# Patient Record
Sex: FEMALE | Race: WHITE | Employment: FULL TIME | URBAN - METROPOLITAN AREA
[De-identification: names, ages, dates, MRNs, and addresses within clinical notes are randomized per-mention and may not be internally consistent; named-entity substitution may affect disease eponyms.]

---

## 2021-11-16 ENCOUNTER — OFFICE VISIT (OUTPATIENT)
Dept: PRIMARY CARE CLINIC | Age: 26
End: 2021-11-16
Payer: COMMERCIAL

## 2021-11-16 VITALS
WEIGHT: 165 LBS | TEMPERATURE: 97.8 F | HEIGHT: 67 IN | SYSTOLIC BLOOD PRESSURE: 116 MMHG | DIASTOLIC BLOOD PRESSURE: 80 MMHG | BODY MASS INDEX: 25.9 KG/M2 | HEART RATE: 63 BPM | OXYGEN SATURATION: 99 % | RESPIRATION RATE: 16 BRPM

## 2021-11-16 DIAGNOSIS — Z00.00 ANNUAL PHYSICAL EXAM: Primary | ICD-10-CM

## 2021-11-16 DIAGNOSIS — E55.9 VITAMIN D DEFICIENCY: ICD-10-CM

## 2021-11-16 PROCEDURE — 99385 PREV VISIT NEW AGE 18-39: CPT | Performed by: INTERNAL MEDICINE

## 2021-11-16 RX ORDER — LEVONORGESTREL 19.5 MG/1
INTRAUTERINE DEVICE INTRAUTERINE
COMMUNITY

## 2021-11-16 NOTE — PROGRESS NOTES
Chief Complaint   Patient presents with   [address] Maintenance Due   Topic    Hepatitis C Screening     DTaP/Tdap/Td series (1 - Tdap)    Pap Smear         1. Have you been to the ER, urgent care clinic since your last visit? Hospitalized since your last visit? No    2. Have you seen or consulted any other health care providers outside of the [address] [place] [address] since your last visit? Include any pap smears or colon screening.  No    Visit Vitals  /80 (BP 1 Location: Right arm, BP Patient Position: Sitting, BP Cuff Size: Adult)   Pulse 63   Temp 97.8 °F (36.6 °C) (Temporal)   Resp 16   Ht 5' 7\" (1.702 m)   Wt 165 lb (74.8 kg)   SpO2 99%   BMI 25.84 kg/m²

## 2021-11-16 NOTE — PROGRESS NOTES
Che Stiles is a 31 y.o.  female and presents with     Chief Complaint   Patient presents with   [address]     Pt is here to establish care. Pt works for [email or web address]. Pt had physical four years back. Does not smoke  Pt has 4 drinks per week. Grandmother had maternal cancer. However Mom tested neg for BRCA gene  Maternal grandfather  of CHF. Paternal grandfather had Alzheimers   Cousin  of heart failure at age 25 . Had heart transplant. Father had basal cell carcinoma and takes meds for HTN. NO thyroid issues. Never been pregnant  Pts brother is going through mental health problem and has either bipolar or schizophrenia  Pt is engaged and is having some issues. Pt has been with her partner and has been working together in same work place for 5 years. The company where she works got bought. She is not sure if they will have t move. Pt is looking for therapist.        History reviewed. No pertinent past medical history. History reviewed. No pertinent surgical history. Current Outpatient Medications   Medication Sig    levonorgestreL (Kyleena) 17.5 mcg/24 hrs (5 yrs) 19.5 mg IUD IUD      No current facility-administered medications for this visit. Health Maintenance   Topic Date Due    DTaP/Tdap/Td series (1 - Tdap) 2022 (Originally 2015)    Pap Smear  2023 (Originally 2015)    Flu Vaccine  Completed    COVID-19 Vaccine  Completed    Pneumococcal 0-64 years  Aged Out    Hepatitis C Screening  Discontinued     Immunization History   Administered Date(s) Administered    COVID-19, PFIZER, MRNA, LNP-S, PF, 30MCG/0.3ML DOSE 2021, 2021    Influenza Vaccine 2021     No LMP recorded. Patient has had an implant. Allergies and Intolerances:   No Known Allergies    Family History:   History reviewed. No pertinent family history. Social History:   She  reports that she has never smoked.  She has never used smokeless tobacco. She  reports current alcohol use. Review of Systems:   General: negative for - chills, fatigue, fever, weight change  Psych: negative for - anxiety, depression, irritability or mood swings  ENT: negative for - headaches, hearing change, nasal congestion, oral lesions, sneezing or sore throat  Heme/ Lymph: negative for - bleeding problems, bruising, pallor or swollen lymph nodes  Endo: negative for - hot flashes, polydipsia/polyuria or temperature intolerance  Resp: negative for - cough, shortness of breath or wheezing  CV: negative for - chest pain, edema or palpitations  GI: negative for - abdominal pain, change in bowel habits, constipation, diarrhea or nausea/vomiting  : negative for - dysuria, hematuria, incontinence, pelvic pain or vulvar/vaginal symptoms  MSK: negative for - joint pain, joint swelling or muscle pain  Neuro: negative for - confusion, headaches, seizures or weakness  Derm: negative for - dry skin, hair changes, rash or skin lesion changes          Physical:   Vitals:   Vitals:    11/16/21 1351   BP: 116/80   Pulse: 63   Resp: 16   Temp: 97.8 °F (36.6 °C)   TempSrc: Temporal   SpO2: 99%   Weight: 165 lb (74.8 kg)   Height: 5' 7\" (1.702 m)           Exam:   HEENT- atraumatic,normocephalic, awake, oriented, well nourished  Neck - supple,no enlarged lymph nodes, no JVD, no thyromegaly  Chest- CTA, no rhonchi, no crackles  `````````````````````````````````````````````````````````````````````Heart- rrr, no murmurs / gallop/rub  Abdomen- soft,BS+,NT, no hepatosplenomegaly  Ext - no c/c/edema   Neuro- no focal deficits. Power 5/5 all extremities  Skin - warm,dry, no obvious rashes.           Review of Data:   LABS:   No results found for: WBC, HGB, HCT, PLT, HGBEXT, HCTEXT, PLTEXT, HGBEXT, HCTEXT, PLTEXT  No results found for: NA, K, CL, CO2, GLU, BUN, CREA  No results found for: CHOL, CHOLX, CHLST, CHOLV, HDL, HDLP, LDL, LDLC, DLDLP, TGLX, TRIGL, TRIGP  No components found for: GPT        Impression / Plan:        ICD-10-CM ICD-9-CM    1. Annual physical exam  Z00.00 V70.0 CBC WITH AUTOMATED DIFF      METABOLIC PANEL, COMPREHENSIVE      LIPID PANEL      VITAMIN D, 25 HYDROXY      TSH 3RD GENERATION   2. Vitamin D deficiency  E55.9 268.9 VITAMIN D, 25 HYDROXY     Gave resources withregard to Mental health therapists/psycholigsts/psychiatrists in the region. Explained to patient risk benefits of the medications. Advised patient to stop meds if having any side effects. Pt verbalized understanding of the instructions. I have discussed the diagnosis with the patient and the intended plan as seen in the above orders. The patient has received an after-visit summary and questions were answered concerning future plans. I have discussed medication side effects and warnings with the patient as well. I have reviewed the plan of care with the patient, accepted their input and they are in agreement with the treatment goals. Reviewed plan of care. Patient has provided input and agrees with goals. Follow-up and Dispositions    · Return in about 1 year (around 11/16/2022).          Yesi Carter MD

## 2022-04-08 ENCOUNTER — TELEPHONE (OUTPATIENT)
Dept: PRIMARY CARE CLINIC | Age: 27
End: 2022-04-08

## 2022-04-08 NOTE — TELEPHONE ENCOUNTER
Called patient let patient know there was no  appointments for today. Patient was advise to go to urgent care. Patient agreed and was told to call if she needed any further treatment.

## 2022-04-08 NOTE — TELEPHONE ENCOUNTER
----- Message from Jose Greene sent at 4/8/2022  7:39 AM EDT -----  Subject: Appointment Request    Reason for Call: Semi-Routine Cough, Cold Symptoms    QUESTIONS  Type of Appointment? Established Patient  Reason for appointment request? No appointments available during search  Additional Information for Provider? pt is wanting to schedule today if   possible for a sore throat, please call pt   ---------------------------------------------------------------------------  --------------  CALL BACK INFO  What is the best way for the office to contact you? OK to leave message on   voicemail  Preferred Call Back Phone Number? [phone]  ---------------------------------------------------------------------------  --------------  SCRIPT ANSWERS  Relationship to Patient? Self  Are you currently unable to finish sentences due to any difficulty   breathing? No  Are you unable to swallow liquids? No  Are you having fevers (100.4 or greater), chills, or sweats? No  Do you have COPD, asthma or a chronic lung condition? No  Have your symptoms been present for more than 5 days? No  Have you recently (14 days) been seen by a provider for this issue? No  Have you been diagnosed with, awaiting test results for, or told that you   are suspected of having COVID-19 (Coronavirus)? (If patient has tested   negative or was tested as a requirement for work, school, or travel and   not based on symptoms, answer no)? No  Within the past 10 days have you developed any of the following symptoms   (answer no if symptoms have been present longer than 10 days or began   more than 10 days ago)? Fever or Chills, Cough, Shortness of breath or   difficulty breathing, Loss of taste or smell, Sore throat, Nasal   congestion, Sneezing or runny nose, Fatigue or generalized body aches   (answer no if pain is specific to a body part e.g. back pain), Diarrhea,   Headache?  Yes

## 2022-04-12 ENCOUNTER — VIRTUAL VISIT (OUTPATIENT)
Dept: PRIMARY CARE CLINIC | Age: 27
End: 2022-04-12

## 2022-08-31 ENCOUNTER — VIRTUAL VISIT (OUTPATIENT)
Dept: PRIMARY CARE CLINIC | Age: 27
End: 2022-08-31
Payer: COMMERCIAL

## 2022-08-31 DIAGNOSIS — U07.1 COVID-19: Primary | ICD-10-CM

## 2022-08-31 DIAGNOSIS — R05.9 COUGH: ICD-10-CM

## 2022-08-31 PROCEDURE — 99214 OFFICE O/P EST MOD 30 MIN: CPT | Performed by: FAMILY MEDICINE

## 2022-08-31 RX ORDER — BENZONATATE 100 MG/1
100 CAPSULE ORAL
Qty: 15 CAPSULE | Refills: 0 | Status: SHIPPED | OUTPATIENT
Start: 2022-08-31

## 2022-08-31 NOTE — PROGRESS NOTES
Identified pt with two pt identifiers(name and ). Chief Complaint   Patient presents with    Cough    Fever     Up to 100.6       Positive For Covid-19     Monday positive - symptoms started Monday         3 most recent PHQ Screens 2022   Little interest or pleasure in doing things Not at all   Feeling down, depressed, irritable, or hopeless Not at all   Total Score PHQ 2 0   Trouble falling or staying asleep, or sleeping too much Not at all   Feeling tired or having little energy Not at all   Poor appetite, weight loss, or overeating Not at all   Feeling bad about yourself - or that you are a failure or have let yourself or your family down Not at all   Trouble concentrating on things such as school, work, reading, or watching TV Not at all   Moving or speaking so slowly that other people could have noticed; or the opposite being so fidgety that others notice Not at all   Thoughts of being better off dead, or hurting yourself in some way Not at all   PHQ 9 Score 0        There were no vitals filed for this visit. There are no preventive care reminders to display for this patient. 1. Have you been to the ER, urgent care clinic since your last visit? Hospitalized since your last visit? No    2. Have you seen or consulted any other health care providers outside of the [address] [place] [address] since your last visit? Include any pap smears or colon screening. No    3. For patients aged 39-70: Has the patient had a colonoscopy / FIT/ Cologuard? NA - based on age      If the patient is female:    4. For patients aged 41-77: Has the patient had a mammogram within the past 2 years? NA - based on age or sex      11. For patients aged 21-65: Has the patient had a pap smear?  Yes - no Care Gap present

## 2022-08-31 NOTE — PROGRESS NOTES
Festus Oconnor  28 y.o. female  1995  [address]  [id number]   [facility]   Telemedicine Progress Note  Reina Mallory DO       Encounter Date and Time: August 31, 2022 at 9:40 AM    Consent: Festus Oconnor, who was seen by synchronous (real-time) audio-video technology, and/or her healthcare decision maker, is aware that this patient-initiated, Telehealth encounter on 8/31/2022 is a billable service, with coverage as determined by her insurance carrier. She is aware that she may receive a bill and has provided verbal consent to proceed: Yes. Chief Complaint   Patient presents with    Cough    Fever     Up to 100.6       Positive For Covid-19     Monday positive - symptoms started Monday      History of Present Illness   Festus Oconnor is a 28 y.o. female was evaluated by synchronous (real-time) audio-video technology from home, through a secure patient portal.    Symptoms started and tested positive on Monday. She is having chills, body aches, sweating, fever on and off, headache, sore throat, congestion and runny nose. Sneezing/ coughing. Has Tylenol at home. Review of Systems   Review of Systems   Constitutional:  Positive for chills, diaphoresis, fever and malaise/fatigue. Respiratory:  Positive for cough. Negative for shortness of breath. Cardiovascular:  Negative for chest pain. Vitals/Objective:     General: alert, cooperative, no distress   Mental  status: mental status: alert, oriented to person, place, and time, normal mood, behavior, speech, dress, motor activity, and thought processes   Resp: resp: normal effort and no respiratory distress   Neuro: neuro: no gross deficits   Skin: skin: no discoloration or lesions of concern on visible areas   Due to this being a TeleHealth evaluation, many elements of the physical examination are unable to be assessed.       Assessment and Plan:   Time-based coding, delete if not needed: I spent at least 15 minutes with this established patient, and >50% of the time was spent counseling and/or coordinating care regarding COVID 19.    1. COVID-19  Symptomatic care discussed. Quarantine precautions given based on most current CDC recommendations. ER precautions include chest pain, shortness of breath, lethargy, confusion, decreased PO intake/ UOP. Follow up if symptoms are not improving.    - benzonatate (TESSALON) 100 mg capsule; Take 1 Capsule by mouth three (3) times daily as needed for Cough. Dispense: 15 Capsule; Refill: 0    2. Cough  - benzonatate (TESSALON) 100 mg capsule; Take 1 Capsule by mouth three (3) times daily as needed for Cough. Dispense: 15 Capsule; Refill: 0    Time spent in direct conversation with the patient to include medical condition(s) discussed, assessment and treatment plan: 15 min    We discussed the expected course, resolution and complications of the diagnosis(es) in detail. Medication risks, benefits, costs, interactions, and alternatives were discussed as indicated. I advised her to contact the office if her condition worsens, changes or fails to improve as anticipated. She expressed understanding with the diagnosis(es) and plan. Patient understands that this encounter was a temporary measure, and the importance of further follow up and examination was emphasized. Patient verbalized understanding. Electronically Signed: DO Yasmin Berumen is a 28 y.o. female who was evaluated by an audio-video encounter for concerns as above. Patient identification was verified prior to start of the visit. A caregiver was present when appropriate. Due to this being a TeleHealth encounter (During [facility]-05 public health emergency), evaluation of the following organ systems was limited: Vitals/Constitutional/EENT/Resp/CV/GI//MS/Neuro/Skin/Heme-Lymph-Imm.   Pursuant to the emergency declaration under the [facility] [facility], 4376 waiver authority and the [employer] Act, this Virtual Visit was conducted, with patient's (and/or legal guardian's) consent, to reduce the patient's risk of exposure to COVID-19 and provide necessary medical care. Services were provided through a synchronous discussion virtually to substitute for in-person clinic visit. I was in the office. The patient was at home. History   Patients past medical, surgical and family histories were reviewed and updated. History reviewed. No pertinent past medical history. History reviewed. No pertinent surgical history. History reviewed. No pertinent family history. Social History     Socioeconomic History    Marital status: SINGLE     Spouse name: Not on file    Number of children: Not on file    Years of education: Not on file    Highest education level: Not on file   Occupational History    Not on file   Tobacco Use    Smoking status: Never    Smokeless tobacco: Never   Vaping Use    Vaping Use: Never used   Substance and Sexual Activity    Alcohol use: Yes     Comment: 2 week    Drug use: Never    Sexual activity: Yes     Partners: Male     Birth control/protection: I.U.D. Other Topics Concern    Not on file   Social History Narrative    Not on file     Social Determinants of Health     Financial Resource Strain: Not on file   Food Insecurity: Not on file   Transportation Needs: Not on file   Physical Activity: Not on file   Stress: Not on file   Social Connections: Not on file   Intimate Partner Violence: Not on file   Housing Stability: Not on file     Patient Active Problem List   Diagnosis Code    ERRONEOUS ENCOUNTER--DISREGARD           Current Medications/Allergies   Medications and Allergies reviewed:    Current Outpatient Medications   Medication Sig Dispense Refill    benzonatate (TESSALON) 100 mg capsule Take 1 Capsule by mouth three (3) times daily as needed for Cough.  15 Capsule 0 levonorgestreL (Kyleena) 17.5 mcg/24 hrs (5 yrs) 19.5 mg IUD IUD  (Patient not taking: Reported on 8/31/2022)       No Known Allergies

## 2023-08-28 ENCOUNTER — TELEPHONE (OUTPATIENT)
Dept: PRIMARY CARE CLINIC | Facility: CLINIC | Age: 28
End: 2023-08-28

## 2023-08-28 NOTE — TELEPHONE ENCOUNTER
Had appointment today with Dr. Safia Sexton but she wants to start seeing Dr. Pincus Hammans. I cancelled her appointment today. (She is aware that I cancelled it).

## 2023-08-31 NOTE — TELEPHONE ENCOUNTER
Called and left Message for the patient to let her know she can schedule with  both providers have agreed.

## 2024-01-03 LAB
ABO, EXTERNAL RESULT: NORMAL
C. TRACHOMATIS, EXTERNAL RESULT: NEGATIVE
N. GONORRHOEAE, EXTERNAL RESULT: NEGATIVE
RH FACTOR, EXTERNAL RESULT: POSITIVE

## 2024-02-19 ENCOUNTER — OFFICE VISIT (OUTPATIENT)
Age: 29
End: 2024-02-19

## 2024-02-19 VITALS — BODY MASS INDEX: 26 KG/M2 | SYSTOLIC BLOOD PRESSURE: 118 MMHG | WEIGHT: 166 LBS | DIASTOLIC BLOOD PRESSURE: 66 MMHG

## 2024-02-19 DIAGNOSIS — Z34.90 PREGNANCY, UNSPECIFIED GESTATIONAL AGE: Primary | ICD-10-CM

## 2024-02-19 PROCEDURE — 0500F INITIAL PRENATAL CARE VISIT: CPT | Performed by: OBSTETRICS & GYNECOLOGY

## 2024-02-19 NOTE — PROGRESS NOTES
Obstetrical Transfer Note    Ms. Kenna Rivera is a ,  28 y.o. female White (non-) No LMP recorded. Patient is pregnant..  She is transferring to our practice after receiving OB care at [facility], Dr. Love.    She is doing well except for nausea, taking zofran.    EDC dating:  Her due date has been determined by LMP and first trimester US.     Today's US confirms reassuring fetal surveillance and her established EDC.    Her prenatal care up to this point has been notable for benign.    Relevant past pregnancy history:  G1    Relevant past medical history:(relevant to this pregnancy): noncontributory.       Pap/Occupational history:  Last pap smear: last year Results: Normal      Substance history: negative for alcohol, tobacco and street drugs.           Positive for nothing.  Exposure history: There is/are no indoor cat/s in the home.  The patient was instructed to not change the cat litter.   She admits close contact with children on a regular basis.   She has had chicken pox or the vaccine in the past.   Patient denies issues with domestic violence.     Genetic Screening/Teratology Counseling: (Includes patient, baby's father, or anyone in either family with:)  1.  Patient's age >/= 35 at EDC?--no  2.  Thalassemia (Angolan, Greek, Mediterranean, or  background): MCV<80?--no.     3.  Neural tube defect (meningomyelocele, spina bifida, anencephaly)?--no.   4.  Congenital heart defect?--no.  5.  Down syndrome?--no.   6.  Todd-Sachs (Caodaism, Bulgarian Wister)?--no.   7.  Canavan's Disease?--no.   8.  Familial Dysautonomia?--no.   9.  Sickle cell disease or trait ()?--no   The patient has not been tested for sickle trait  10.  Hemophilia or other blood disorders?--no.   11.  Muscular dystrophy?--no.  12.  Cystic fibrosis?--no.  13.  Inola's Chorea?--no.  14.  Mental retardation/autism (if yes was person tested for Fragile X)?--no.   15.  Other inherited genetic or chromosomal disorder?--no.

## 2024-03-18 ENCOUNTER — ROUTINE PRENATAL (OUTPATIENT)
Age: 29
End: 2024-03-18

## 2024-03-18 VITALS — DIASTOLIC BLOOD PRESSURE: 64 MMHG | BODY MASS INDEX: 26.47 KG/M2 | WEIGHT: 169 LBS | SYSTOLIC BLOOD PRESSURE: 112 MMHG

## 2024-03-18 DIAGNOSIS — Z3A.15 15 WEEKS GESTATION OF PREGNANCY: Primary | ICD-10-CM

## 2024-03-18 LAB
ABO + RH BLD: NORMAL
BLOOD BANK CMNT PATIENT-IMP: NORMAL
BLOOD GROUP ANTIBODIES SERPL: NORMAL
SPECIMEN EXP DATE BLD: NORMAL

## 2024-03-18 PROCEDURE — 0502F SUBSEQUENT PRENATAL CARE: CPT | Performed by: OBSTETRICS & GYNECOLOGY

## 2024-03-18 RX ORDER — ONDANSETRON 4 MG/1
TABLET, FILM COATED ORAL
COMMUNITY
Start: 2024-01-22 | End: 2024-03-18 | Stop reason: SDUPTHER

## 2024-03-18 RX ORDER — ONDANSETRON 4 MG/1
4 TABLET, FILM COATED ORAL EVERY 8 HOURS PRN
Qty: 20 TABLET | Refills: 1 | Status: SHIPPED | OUTPATIENT
Start: 2024-03-18

## 2024-03-18 SDOH — ECONOMIC STABILITY: FOOD INSECURITY: WITHIN THE PAST 12 MONTHS, THE FOOD YOU BOUGHT JUST DIDN'T LAST AND YOU DIDN'T HAVE MONEY TO GET MORE.: NEVER TRUE

## 2024-03-18 SDOH — ECONOMIC STABILITY: FOOD INSECURITY: WITHIN THE PAST 12 MONTHS, YOU WORRIED THAT YOUR FOOD WOULD RUN OUT BEFORE YOU GOT MONEY TO BUY MORE.: NEVER TRUE

## 2024-03-18 SDOH — ECONOMIC STABILITY: HOUSING INSECURITY
IN THE LAST 12 MONTHS, WAS THERE A TIME WHEN YOU DID NOT HAVE A STEADY PLACE TO SLEEP OR SLEPT IN A SHELTER (INCLUDING NOW)?: NO

## 2024-03-18 SDOH — ECONOMIC STABILITY: INCOME INSECURITY: HOW HARD IS IT FOR YOU TO PAY FOR THE VERY BASICS LIKE FOOD, HOUSING, MEDICAL CARE, AND HEATING?: NOT HARD AT ALL

## 2024-03-18 SDOH — ECONOMIC STABILITY: TRANSPORTATION INSECURITY
IN THE PAST 12 MONTHS, HAS LACK OF TRANSPORTATION KEPT YOU FROM MEETINGS, WORK, OR FROM GETTING THINGS NEEDED FOR DAILY LIVING?: NO

## 2024-03-18 ASSESSMENT — PATIENT HEALTH QUESTIONNAIRE - PHQ9
1. LITTLE INTEREST OR PLEASURE IN DOING THINGS: NOT AT ALL
SUM OF ALL RESPONSES TO PHQ QUESTIONS 1-9: 0
2. FEELING DOWN, DEPRESSED OR HOPELESS: NOT AT ALL
1. LITTLE INTEREST OR PLEASURE IN DOING THINGS: NOT AT ALL
SUM OF ALL RESPONSES TO PHQ QUESTIONS 1-9: 0
2. FEELING DOWN, DEPRESSED OR HOPELESS: NOT AT ALL
SUM OF ALL RESPONSES TO PHQ QUESTIONS 1-9: 0
SUM OF ALL RESPONSES TO PHQ QUESTIONS 1-9: 0
SUM OF ALL RESPONSES TO PHQ9 QUESTIONS 1 & 2: 0
SUM OF ALL RESPONSES TO PHQ9 QUESTIONS 1 & 2: 0

## 2024-03-19 LAB
ERYTHROCYTE [DISTWIDTH] IN BLOOD BY AUTOMATED COUNT: 13.2 % (ref 11.5–14.5)
HBV SURFACE AG SER QL: <0.1 INDEX
HBV SURFACE AG SER QL: NEGATIVE
HCT VFR BLD AUTO: 36.8 % (ref 35–47)
HCV AB SER IA-ACNC: <0.02 INDEX
HCV AB SERPL QL IA: NONREACTIVE
HGB BLD-MCNC: 12.7 G/DL (ref 11.5–16)
HIV 1+2 AB+HIV1 P24 AG SERPL QL IA: NONREACTIVE
HIV 1/2 RESULT COMMENT: NORMAL
MCH RBC QN AUTO: 30.8 PG (ref 26–34)
MCHC RBC AUTO-ENTMCNC: 34.5 G/DL (ref 30–36.5)
MCV RBC AUTO: 89.3 FL (ref 80–99)
NRBC # BLD: 0 K/UL (ref 0–0.01)
NRBC BLD-RTO: 0 PER 100 WBC
PLATELET # BLD AUTO: 259 K/UL (ref 150–400)
PMV BLD AUTO: 10.1 FL (ref 8.9–12.9)
RBC # BLD AUTO: 4.12 M/UL (ref 3.8–5.2)
RUBV IGG SERPL IA-ACNC: NORMAL IU/ML
WBC # BLD AUTO: 10.5 K/UL (ref 3.6–11)

## 2024-03-20 LAB
T PALLIDUM AB SER QL IA: NON REACTIVE
VZV IGG SER IA-ACNC: 716 INDEX

## 2024-03-21 LAB
AFP INTERP SERPL-IMP: NORMAL
AFP MOM SERPL: 0.97
AFP SERPL-MCNC: 27.8 NG/ML
AGE AT DELIVERY: 30.2 YR
AGE OF EGG DONOR: NORMAL
AGE OF EGG DONOR: NORMAL
COMMENT: NORMAL
DONOR EGG?: 3
DONOR EGG?: NO
FAMILY HISTORY NTD: NORMAL
FHX NTD (Y OR N): NORMAL
GA METHOD: NORMAL
GA: 15.4 WEEKS
IDDM PATIENT QL: NO
INSULIN DEP. DIABETIC: 0
Lab: 169
Lab: NORMAL
Lab: NORMAL
MAT SCN FOR FETAL ABNORMALITIES SERPL: NORMAL
MULTIPLE PREGNANCY: NO
NEURAL TUBE DEFECT RISK FETUS: NORMAL
NUMBER OF FETUSES: NO
OTHER INDICATIONS: NO
OTHER INDICATIONS: NORMAL
PREVIOUSLY ELEVATED AFP (Y OR N): 15
PREVIOUSLY ELEVATED AFP (Y OR N): NO
PRIOR 1ST TRIM TESTING ?: NO
PRIOR 2ND TRIM TESTING ?: NO
PRIOR DS/NTD SCREEN CURRENT PREGNANCY?: NO
PRIOR DS/NTD SCREEN CURRENT PREGNANCY?: NORMAL
PRIOR FIRST TRIMESTER TESTING (Y OR N ): NORMAL
PRIOR PREGNANCY WITH DOWN SYNDROME (Y OR N): 1
PRIOR PREGNANCY WITH DOWN SYNDROME (Y OR N): NO
TYPE OF EGG DONOR: NORMAL
TYPE OF EGG DONOR: NORMAL

## 2024-03-22 LAB
HGB A MFR BLD: 96.9 % (ref 96.4–98.8)
HGB A2 MFR BLD COLUMN CHROM: 3.1 % (ref 1.8–3.2)
HGB F MFR BLD: 0 % (ref 0–2)
HGB FRACT BLD-IMP: NORMAL
HGB S MFR BLD: 0 %

## 2024-04-29 ENCOUNTER — ROUTINE PRENATAL (OUTPATIENT)
Age: 29
End: 2024-04-29
Payer: COMMERCIAL

## 2024-04-29 ENCOUNTER — ROUTINE PRENATAL (OUTPATIENT)
Age: 29
End: 2024-04-29

## 2024-04-29 ENCOUNTER — TELEPHONE (OUTPATIENT)
Age: 29
End: 2024-04-29

## 2024-04-29 VITALS — WEIGHT: 173 LBS | BODY MASS INDEX: 27.1 KG/M2 | SYSTOLIC BLOOD PRESSURE: 122 MMHG | DIASTOLIC BLOOD PRESSURE: 68 MMHG

## 2024-04-29 VITALS — DIASTOLIC BLOOD PRESSURE: 76 MMHG | SYSTOLIC BLOOD PRESSURE: 110 MMHG | HEART RATE: 71 BPM

## 2024-04-29 DIAGNOSIS — O35.EXX0 PYELECTASIS OF FETUS ON PRENATAL ULTRASOUND: ICD-10-CM

## 2024-04-29 DIAGNOSIS — Z3A.21 21 WEEKS GESTATION OF PREGNANCY: Primary | ICD-10-CM

## 2024-04-29 PROCEDURE — 1036F TOBACCO NON-USER: CPT | Performed by: OBSTETRICS & GYNECOLOGY

## 2024-04-29 PROCEDURE — 76811 OB US DETAILED SNGL FETUS: CPT | Performed by: OBSTETRICS & GYNECOLOGY

## 2024-04-29 PROCEDURE — G8427 DOCREV CUR MEDS BY ELIG CLIN: HCPCS | Performed by: OBSTETRICS & GYNECOLOGY

## 2024-04-29 PROCEDURE — 99203 OFFICE O/P NEW LOW 30 MIN: CPT | Performed by: OBSTETRICS & GYNECOLOGY

## 2024-04-29 PROCEDURE — 0502F SUBSEQUENT PRENATAL CARE: CPT | Performed by: ADVANCED PRACTICE MIDWIFE

## 2024-04-29 PROCEDURE — G8419 CALC BMI OUT NRM PARAM NOF/U: HCPCS | Performed by: OBSTETRICS & GYNECOLOGY

## 2024-04-29 RX ORDER — POLYETHYLENE GLYCOL 3350 17 G/17G
17 POWDER, FOR SOLUTION ORAL DAILY PRN
COMMUNITY

## 2024-04-29 NOTE — PROGRESS NOTES
FETAL SURVEY A SINGLE VIABLE IUP AT 21W3D IS SEEN. FETAL CARDIAC MOTION OBSERVED. FETAL ANATOMY WAS WELL VISUALIZED AND APPEARS WNL. THERE APPEARS TO BE FLUID VISUALIZED SUPERIOR RIGHT OF THE DIAPHRAGM. POSSIBLE PLURAL EFFUSION. APPROPRIATE GROWTH MEASURED. SIZE = DATES. JAZMYNE, PLACENTA AND CERVIX APPEAR WITHIN NORMAL LIMITS. GENDER: WNL

## 2024-04-29 NOTE — PROGRESS NOTES
FETAL SURVEY A SINGLE VIABLE IUP AT 21W3D IS SEEN. FETAL CARDIAC MOTION OBSERVED. FETAL ANATOMY WAS WELL VISUALIZED AND APPEARS WNL. THERE APPEARS TO BE FLUID VISUALIZED SUPERIOR RIGHT OF THE DIAPHRAGM. POSSIBLE PLURAL EFFUSION. APPROPRIATE GROWTH MEASURED. SIZE = DATES. JAZMYNE, PLACENTA AND CERVIX APPEAR WITHIN NORMAL LIMITS. GENDER: WNL     Reviewed US tech report with Dr. Cartwright. Recommends MFM apt for further assessment  Reviewed with pt and - apt obtained prior to them leaving the office.   Awaiting further assessment  MASON 4 weeks

## 2024-04-29 NOTE — TELEPHONE ENCOUNTER
Received an call from Moy from [facility] that the patient needed to be seen today. Pt was scheduled for today, Monday 29th at 10:30. Called pt no answer, left vm, sent my chart message. Called the Ob's office to see if they could let the pt know before she left the office and patient was already gone.

## 2024-04-30 PROBLEM — O35.EXX0 PYELECTASIS OF FETUS ON PRENATAL ULTRASOUND: Status: ACTIVE | Noted: 2024-04-30

## 2024-04-30 NOTE — PROCEDURES
PATIENT: DAY ALLEN E   -  : 1995   -  DOS:2024   -  INTERPRETING PROVIDER:Fatoumata Baugh,   Indication  ========    Anatomy, Hx Pleural Effusion    Method  ======    Transabdominal ultrasound examination. View: Suboptimal view: limited by fetal position    Dating  ======    Previous Ultrasound on: 2024  Type of prior assessment: GA  GA at prior assessment date 7 w + 3 d  GA by previous U/S 21 w + 3 d  YOLANDA by previous Ultrasound: 2024  Ultrasound examination on: 2024  GA by U/S based upon: AC, BPD, Femur, HC  GA by U/S 21 w + 1 d  YOLANDA by U/S: 2024  Assigned: based on ultrasound (GA), selected on 2024  Assigned GA 21 w + 3 d  Assigned YOLANDA: 2024    Fetal Growth Overview  =================    Exam date        GA              BPD (mm)          HC (mm)              AC (mm)               FL (mm)             HL (mm)            EFW (g)  2024        21w 3d        48.1     16%        187.8    25%        170.7     63%        34.5    22%        33.6     46%        423    43%    Fetal Biometry  ============    Standard  BPD 48.1 mm 20w 4d 16% Hadlock  OFD 69.1 mm 23w 0d 93% Orlin  .8 mm 21w 1d 25% Hadlock  Cerebellum tr 21.9 mm 20w 4d 36% Hill  Nuchal fold 5.2 mm  .7 mm 22w 0d 63% Hadlock  Femur 34.5 mm 20w 6d 22% Hadlock  Humerus 33.6 mm 21w 3d 46% Orlin   g 21w 2d 43% Hadlock  EFW (lb) 0 lb  EFW (oz) 15 oz  EFW by: Hadlock (BPD-HC-AC-FL)  Extended   5.8 mm  CM 5.9 mm  68% Nicolaides  Nasal bone 6.8 mm  Head / Face / Neck  Nasal bone: present  Other Structures   bpm    General Evaluation  ==============    Cardiac activity present.  bpm. Fetal movements: visualized. Presentation: Breech  Placenta: Placental site: posterior, appropriate distance from the internal os  Umbilical cord: Cord vessels: 3 vessel cord. Insertion site: central  Amniotic fluid: Amount of AF: normal. MVP 2.8 cm    Fetal

## 2024-05-22 ENCOUNTER — ROUTINE PRENATAL (OUTPATIENT)
Age: 29
End: 2024-05-22
Payer: COMMERCIAL

## 2024-05-22 VITALS
DIASTOLIC BLOOD PRESSURE: 75 MMHG | HEIGHT: 67 IN | SYSTOLIC BLOOD PRESSURE: 108 MMHG | WEIGHT: 178 LBS | HEART RATE: 97 BPM | OXYGEN SATURATION: 98 % | BODY MASS INDEX: 27.94 KG/M2 | RESPIRATION RATE: 16 BRPM

## 2024-05-22 DIAGNOSIS — O35.EXX0 PYELECTASIS OF FETUS ON PRENATAL ULTRASOUND: ICD-10-CM

## 2024-05-22 DIAGNOSIS — Z3A.24 24 WEEKS GESTATION OF PREGNANCY: Primary | ICD-10-CM

## 2024-05-22 PROCEDURE — 76816 OB US FOLLOW-UP PER FETUS: CPT | Performed by: OBSTETRICS & GYNECOLOGY

## 2024-05-22 PROCEDURE — 1036F TOBACCO NON-USER: CPT | Performed by: OBSTETRICS & GYNECOLOGY

## 2024-05-22 PROCEDURE — G8428 CUR MEDS NOT DOCUMENT: HCPCS | Performed by: OBSTETRICS & GYNECOLOGY

## 2024-05-22 PROCEDURE — 99212 OFFICE O/P EST SF 10 MIN: CPT | Performed by: OBSTETRICS & GYNECOLOGY

## 2024-05-22 PROCEDURE — G8419 CALC BMI OUT NRM PARAM NOF/U: HCPCS | Performed by: OBSTETRICS & GYNECOLOGY

## 2024-05-24 NOTE — PROCEDURES
PATIENT: DAY ALLEN E   -  : 1995   -  DOS:2024   -  INTERPRETING PROVIDER:Fatoumata Baugh,   Indication  ========    Anatomy, Hx Pleural Effusion (not found on detailed scan)    Method  ======    Transabdominal ultrasound examination. View: Suboptimal view: limited by fetal position    Pregnancy  =========    Ramires pregnancy. Number of fetuses: 1    Dating  ======    Previous Ultrasound on: 2024  Type of prior assessment: GA  GA at prior assessment date 7 w + 3 d  GA by previous U/S 24 w + 5 d  YOLANDA by previous Ultrasound: 2024  Ultrasound examination on: 2024  GA by U/S based upon: AC, BPD, Femur, HC  GA by U/S 24 w + 3 d  YOLANDA by U/S: 2024  Assigned: based on ultrasound (GA), selected on 2024  Assigned GA 24 w + 5 d  Assigned YOLANDA: 2024    Fetal Biometry  ============    Standard  BPD 58.5 mm 24w 0d 17% Hadlock  OFD 81.2 mm 26w 3d 93% Orlin  .3 mm 24w 3d 18% Hadlock  Cerebellum tr 26.2 mm 23w 4d 27% Hill  .9 mm 24w 3d 33% Hadlock  Femur 44.7 mm 24w 5d 37% Hadlock  Humerus 40.1 mm 24w 2d 29% Orlin   g 24w 2d 32% Hadlock  EFW (lb) 1 lb  EFW (oz) 9 oz  EFW by: Hadlock (BPD-HC-AC-FL)  Extended   5.4 mm  CM 8.7 mm  97% Nicolaides  Other Structures   bpm    General Evaluation  ==============    Cardiac activity present.  bpm. Fetal movements: visualized. Presentation: pavan breech  Placenta: Placental site: posterior, appropriate distance from the internal os. Placental edge-to-cervical os distance 4.4 cm  Umbilical cord: Cord vessels: 3 vessel cord. Insertion site: central  Amniotic fluid: Amount of AF: normal. MVP 3.5 cm    Fetal Anatomy  ===========    Cranium: normal  Lateral ventricles: normal  Cavum septi pellucidi: normal  Cerebellum: normal  Cisterna magna: normal  Head / Neck  Vermis: normal  4-chamber view: normal  3-vessel-trachea view: normal  Heart / Thorax  Situs: situs solitus (normal)  Aortic arch

## 2024-06-20 ENCOUNTER — ROUTINE PRENATAL (OUTPATIENT)
Age: 29
End: 2024-06-20

## 2024-06-20 VITALS — SYSTOLIC BLOOD PRESSURE: 125 MMHG | DIASTOLIC BLOOD PRESSURE: 80 MMHG | HEART RATE: 75 BPM

## 2024-06-20 DIAGNOSIS — O35.EXX0 PYELECTASIS OF FETUS ON PRENATAL ULTRASOUND: Primary | ICD-10-CM

## 2024-06-20 NOTE — PROCEDURES
PATIENT: DAY ALLEN E   -  : 1995   -  DOS:2024   -  INTERPRETING PROVIDER:William Hernandez,   Indication  ========    Hx Pleural Effusion (not found on detailed scan), Pyelectasis    Method  ======    Transabdominal ultrasound examination. View: Sufficient    Pregnancy  =========    Ramires pregnancy. Number of fetuses: 1    Dating  ======    Previous Ultrasound on: 2024  Type of prior assessment: GA  GA at prior assessment date 7 w + 3 d  GA by previous U/S 28 w + 6 d  YOLANDA by previous Ultrasound: 2024  Ultrasound examination on: 2024  GA by U/S based upon: AC, BPD, Femur, HC  GA by U/S 29 w + 2 d  YOLANDA by U/S: 9/3/2024  Assigned: based on ultrasound (GA), selected on 2024  Assigned GA 28 w + 6 d  Assigned YOLANDA: 2024    Fetal Biometry  ============    Standard  BPD 74.4 mm 29w 6d 70% Hadlock  OFD 97.2 mm 31w 3d 96% Orlin  .1 mm 29w 6d 46% Hadlock  Cerebellum tr 34.4 mm 28w 6d 61% Hill  .4 mm 28w 5d 38% Hadlock  Femur 54.7 mm 28w 6d 36% Hadlock  EFW 1,310 g 28w 4d 40% Hadlock  EFW (lb) 2 lb  EFW (oz) 14 oz  EFW by: Hadlock (BPD-HC-AC-FL)  Extended  CM 6.0 mm  26% Nicolaides  Other Structures   bpm    General Evaluation  ==============    Cardiac activity present.  bpm. Fetal movements: visualized. Presentation: Cephalic  Placenta: Placental site: posterior, no previa prior  Umbilical cord: Cord vessels: 3 vessel cord. Insertion site: central  Amniotic fluid: Amount of AF: normal. MVP 4.9 cm    Fetal Anatomy  ===========    Cranium: normal  Lateral ventricles: normal  Choroid plexus: normal  Midline falx: normal  Cavum septi pellucidi: normal  Cerebellum: normal  Cisterna magna: normal  4-chamber view: normal  RVOT view: normal  LVOT view: normal  3-vessel view: normal  3-vessel-trachea view: normal  Heart / Thorax  Situs: situs solitus (normal)  Cardiac rhythm: regular (normal)  Stomach: normal  Kidneys: normal  Bladder: normal  Abdomen  Rt

## 2024-06-21 ENCOUNTER — NURSE ONLY (OUTPATIENT)
Age: 29
End: 2024-06-21

## 2024-06-21 ENCOUNTER — ROUTINE PRENATAL (OUTPATIENT)
Age: 29
End: 2024-06-21

## 2024-06-21 VITALS — DIASTOLIC BLOOD PRESSURE: 80 MMHG | SYSTOLIC BLOOD PRESSURE: 120 MMHG | BODY MASS INDEX: 29.1 KG/M2 | WEIGHT: 185.8 LBS

## 2024-06-21 DIAGNOSIS — Z34.90 PREGNANCY, UNSPECIFIED GESTATIONAL AGE: ICD-10-CM

## 2024-06-21 DIAGNOSIS — Z3A.15 15 WEEKS GESTATION OF PREGNANCY: Primary | ICD-10-CM

## 2024-06-21 LAB
BLOOD BANK CMNT PATIENT-IMP: NORMAL
BLOOD GROUP ANTIBODIES SERPL: NORMAL
ERYTHROCYTE [DISTWIDTH] IN BLOOD BY AUTOMATED COUNT: 13 % (ref 11.5–14.5)
GLUCOSE 1H P 100 G GLC PO SERPL-MCNC: 156 MG/DL (ref 65–140)
HCT VFR BLD AUTO: 34.6 % (ref 35–47)
HGB BLD-MCNC: 11.3 G/DL (ref 11.5–16)
MCH RBC QN AUTO: 30.2 PG (ref 26–34)
MCHC RBC AUTO-ENTMCNC: 32.7 G/DL (ref 30–36.5)
MCV RBC AUTO: 92.5 FL (ref 80–99)
NRBC # BLD: 0 K/UL (ref 0–0.01)
NRBC BLD-RTO: 0 PER 100 WBC
PLATELET # BLD AUTO: 229 K/UL (ref 150–400)
PMV BLD AUTO: 10.3 FL (ref 8.9–12.9)
RBC # BLD AUTO: 3.74 M/UL (ref 3.8–5.2)
WBC # BLD AUTO: 11 K/UL (ref 3.6–11)

## 2024-06-21 PROCEDURE — 0502F SUBSEQUENT PRENATAL CARE: CPT | Performed by: OBSTETRICS & GYNECOLOGY

## 2024-06-21 NOTE — PROGRESS NOTES
Kenna Rivera is 29w0d   Doing well  +FM  Denies LOF, bleeding/spotting, cramping, headache, blurred vision, edema, or RUQ pain.   Patient desires to sleep on her back - educated her on making sure there is a tilt under hip.   Patient wondering if she is okay to travel via airplane in 2 weeks.     [facility] yesterday 06/20/2024:  Intrauterine Ramires pregnancy at 28w 6d by clinical dates. EFW is 1310 g at 40%, abdominal circumference at 38%. Anatomy visualized as stated above. Amnio c fluid: normal. Placenta is posterior, no previa prior. Cephalic presenta on.

## 2024-06-24 LAB — T PALLIDUM AB SER QL IA: NON REACTIVE

## 2024-06-27 ENCOUNTER — NURSE ONLY (OUTPATIENT)
Age: 29
End: 2024-06-27

## 2024-06-27 DIAGNOSIS — O99.810 ABNORMAL GLUCOSE AFFECTING PREGNANCY: Primary | ICD-10-CM

## 2024-06-27 LAB
GESTATIONAL 3HR GTT: NORMAL
GLUCOSE 1H P 100 G GLC PO SERPL-MCNC: 113 MG/DL (ref 65–180)
GLUCOSE 2 HOUR: 99 MG/DL (ref 65–155)
GLUCOSE P FAST SERPL-MCNC: 68 MG/DL (ref 65–95)
GLUCOSE, 3 HOUR: 71 MG/DL (ref 65–140)

## 2024-07-12 ENCOUNTER — ROUTINE PRENATAL (OUTPATIENT)
Age: 29
End: 2024-07-12

## 2024-07-12 VITALS — BODY MASS INDEX: 29.6 KG/M2 | WEIGHT: 189 LBS | SYSTOLIC BLOOD PRESSURE: 102 MMHG | DIASTOLIC BLOOD PRESSURE: 70 MMHG

## 2024-07-12 DIAGNOSIS — Z3A.32 32 WEEKS GESTATION OF PREGNANCY: Primary | ICD-10-CM

## 2024-07-12 PROCEDURE — 0502F SUBSEQUENT PRENATAL CARE: CPT | Performed by: ADVANCED PRACTICE MIDWIFE

## 2024-07-12 NOTE — PROGRESS NOTES
Patient doing well, no complaints. Good fetal movement.  Has m follow up appt early August.  Recently returned from [place].

## 2024-07-12 NOTE — PROGRESS NOTES
MASON:  Kenna Rivera is a 29 y.o.  at 32w0d  who presents for routine OB appointment    Chief Complaint   Patient presents with    Routine Prenatal Visit          /70   Wt 85.7 kg (189 lb)   BMI 29.60 kg/m²   FHTs: 140s  FH: 30    Tdap reviewed, for next visit.    ABO: O POSITIVE     RhoGAM: N/A     Subjective: Doing well, no complaints. Good FM. Denies vaginal bleeding, Leaking of fluid, regular contractions.    Patient has a follow-up with MFM on , size today was 30 cm.  We discussed patient classes breast-feeding, we reviewed birth plan.    Third trimester precautions given.   labor precautions reviewed.     Follow up in 2 weeks.     VIANA Plummer - KAEL

## 2024-07-29 ENCOUNTER — ROUTINE PRENATAL (OUTPATIENT)
Age: 29
End: 2024-07-29
Payer: COMMERCIAL

## 2024-07-29 VITALS — DIASTOLIC BLOOD PRESSURE: 72 MMHG | WEIGHT: 194 LBS | SYSTOLIC BLOOD PRESSURE: 112 MMHG | BODY MASS INDEX: 30.38 KG/M2

## 2024-07-29 DIAGNOSIS — Z3A.34 34 WEEKS GESTATION OF PREGNANCY: Primary | ICD-10-CM

## 2024-07-29 PROCEDURE — 0502F SUBSEQUENT PRENATAL CARE: CPT | Performed by: OBSTETRICS & GYNECOLOGY

## 2024-07-29 PROCEDURE — 90471 IMMUNIZATION ADMIN: CPT | Performed by: OBSTETRICS & GYNECOLOGY

## 2024-07-29 PROCEDURE — 90715 TDAP VACCINE 7 YRS/> IM: CPT | Performed by: OBSTETRICS & GYNECOLOGY

## 2024-08-07 ENCOUNTER — ROUTINE PRENATAL (OUTPATIENT)
Age: 29
End: 2024-08-07
Payer: COMMERCIAL

## 2024-08-07 VITALS — HEART RATE: 66 BPM | SYSTOLIC BLOOD PRESSURE: 120 MMHG | DIASTOLIC BLOOD PRESSURE: 81 MMHG

## 2024-08-07 DIAGNOSIS — Z3A.35 35 WEEKS GESTATION OF PREGNANCY: ICD-10-CM

## 2024-08-07 DIAGNOSIS — O35.EXX0 PYELECTASIS OF FETUS ON PRENATAL ULTRASOUND: Primary | ICD-10-CM

## 2024-08-07 PROCEDURE — 76816 OB US FOLLOW-UP PER FETUS: CPT | Performed by: OBSTETRICS & GYNECOLOGY

## 2024-08-07 PROCEDURE — 99024 POSTOP FOLLOW-UP VISIT: CPT | Performed by: OBSTETRICS & GYNECOLOGY

## 2024-08-07 NOTE — PROCEDURES
PATIENT: DAY ALLEN E   -  : 1995   -  DOS:2024   -  INTERPRETING PROVIDER:Geremias Castaneda,   Indication  ========    Hx Pleural Effusion (not found on detailed scan), Pyelectasis (RESOLVED)    Method  ======    Transabdominal ultrasound examination. View: Sufficient    Pregnancy  =========    Ramires pregnancy. Number of fetuses: 1    Dating  ======    Previous Ultrasound on: 2024  Type of prior assessment: GA  GA at prior assessment date 7 w + 3 d  GA by previous U/S 35 w + 5 d  YOLANDA by previous Ultrasound: 2024  Ultrasound examination on: 2024  GA by U/S based upon: AC, BPD, Femur, HC  GA by U/S 35 w + 4 d  YOLANDA by U/S: 2024  Assigned: based on ultrasound (GA), selected on 2024  Assigned GA 35 w + 5 d  Assigned YOLANDA: 2024    Fetal Biometry  ============    Standard  BPD 88.8 mm 35w 6d 63% Hadlock  .9 mm 39w 0d 91% Orlin  .3 mm 36w 3d 35% Hadlock  Cerebellum tr 47.1 mm 35w 4d 38% Hill  .2 mm 34w 5d 31% Hadlock  Femur 69.0 mm 35w 3d 37% Hadlock  EFW 2,626 g 35w 1d 37% Hadlock  EFW (lb) 5 lb  EFW (oz) 13 oz  EFW by: Hadlock (BPD-HC-AC-FL)  Extended   1.8 mm  Other Structures   bpm    General Evaluation  ==============    Cardiac activity present.  bpm. Fetal movements: visualized. Presentation: Cephalic  Placenta: Placental site: posterior, no previa prior  Umbilical cord: Cord vessels: 3 vessel cord. Insertion site: central  Amniotic fluid: Amount of AF: normal. MVP 4.0 cm. JAZMYNE 10.4 cm. Q1 4.0 cm, Q2 3.8 cm, Q3 2.6 cm, Q4 0.0 cm    Fetal Anatomy  ===========    Cranium: normal  Lateral ventricles: normal  Cavum septi pellucidi: normal  Cerebellum: normal  Cisterna magna: normal  4-chamber view: normal  RVOT view: normal  LVOT view: normal  3-vessel view: normal  3-vessel-trachea view: normal  Heart / Thorax  Situs: situs solitus (normal)  Cardiac rhythm: regular (normal)  Stomach: normal  Kidneys: normal  Bladder: normal  Abdomen  Rt

## 2024-08-13 ENCOUNTER — ROUTINE PRENATAL (OUTPATIENT)
Age: 29
End: 2024-08-13

## 2024-08-13 VITALS — DIASTOLIC BLOOD PRESSURE: 72 MMHG | WEIGHT: 198 LBS | BODY MASS INDEX: 31.01 KG/M2 | SYSTOLIC BLOOD PRESSURE: 116 MMHG

## 2024-08-13 DIAGNOSIS — Z3A.36 36 WEEKS GESTATION OF PREGNANCY: Primary | ICD-10-CM

## 2024-08-13 PROCEDURE — 0502F SUBSEQUENT PRENATAL CARE: CPT | Performed by: OBSTETRICS & GYNECOLOGY

## 2024-08-13 NOTE — PROGRESS NOTES
Patient doing well, no complaints. Good fetal movement.  GBS today. Was seen by M 8/7, normal scan, pyelectasis resolved.

## 2024-08-15 LAB
GP B STREP DNA SPEC QL NAA+PROBE: NEGATIVE
SPECIMEN SOURCE: NORMAL

## 2024-08-20 ENCOUNTER — ROUTINE PRENATAL (OUTPATIENT)
Age: 29
End: 2024-08-20

## 2024-08-20 VITALS — DIASTOLIC BLOOD PRESSURE: 70 MMHG | BODY MASS INDEX: 31.64 KG/M2 | SYSTOLIC BLOOD PRESSURE: 102 MMHG | WEIGHT: 202 LBS

## 2024-08-20 DIAGNOSIS — Z3A.37 37 WEEKS GESTATION OF PREGNANCY: Primary | ICD-10-CM

## 2024-08-20 PROCEDURE — 0502F SUBSEQUENT PRENATAL CARE: CPT | Performed by: OBSTETRICS & GYNECOLOGY

## 2024-08-27 ENCOUNTER — ROUTINE PRENATAL (OUTPATIENT)
Age: 29
End: 2024-08-27

## 2024-08-27 VITALS — WEIGHT: 201 LBS | DIASTOLIC BLOOD PRESSURE: 72 MMHG | BODY MASS INDEX: 31.48 KG/M2 | SYSTOLIC BLOOD PRESSURE: 106 MMHG

## 2024-08-27 DIAGNOSIS — Z3A.38 38 WEEKS GESTATION OF PREGNANCY: Primary | ICD-10-CM

## 2024-08-27 PROCEDURE — 0502F SUBSEQUENT PRENATAL CARE: CPT | Performed by: OBSTETRICS & GYNECOLOGY

## 2024-08-27 NOTE — PROGRESS NOTES
Patient doing well, no complaints. Good fetal movement  Lengthy review regarding expectations of birth and wording of birth plan